# Patient Record
Sex: MALE | Race: WHITE | NOT HISPANIC OR LATINO | URBAN - METROPOLITAN AREA
[De-identification: names, ages, dates, MRNs, and addresses within clinical notes are randomized per-mention and may not be internally consistent; named-entity substitution may affect disease eponyms.]

---

## 2017-01-01 ENCOUNTER — OFFICE VISIT - RIVER FALLS (OUTPATIENT)
Dept: FAMILY MEDICINE | Facility: CLINIC | Age: 32
End: 2017-01-01

## 2017-01-01 ENCOUNTER — COMMUNICATION - RIVER FALLS (OUTPATIENT)
Dept: FAMILY MEDICINE | Facility: CLINIC | Age: 32
End: 2017-01-01

## 2022-02-11 VITALS
DIASTOLIC BLOOD PRESSURE: 86 MMHG | BODY MASS INDEX: 48.92 KG/M2 | SYSTOLIC BLOOD PRESSURE: 146 MMHG | HEART RATE: 87 BPM | WEIGHT: 315 LBS | TEMPERATURE: 99.2 F

## 2022-02-16 NOTE — PROGRESS NOTES
Patient:   KHUSHBU SÁNCHEZ            MRN: [id number]            FIN: [id number]               Age:   32 years     Sex:  Male     :  1985   Associated Diagnoses:   Tonsillitis   Author:   Fredi Castro MD      Visit Information      Date of Service: 2017 06:28 pm  Performing Location: [facility]  Encounter#: [id number]      Primary Care Provider (PCP):  Tono Reynolds MD    NPI# [id number]      Referring Provider:  Fredi Castro MD    NPI# [id number]      Chief Complaint   2017 6:36 PM CDT    Woke up with sore throat, swollen tonsils Monday.  Swelling has gone a bit              Additional Information:No additional information recorded during visit.   Chief complaint and symptoms as noted above and confirmed with patient.  Recent lab and diagnostic studies reviewed with patient      History of Present Illness   2017: Presents with four-day history of tonsillitis and background upper respiratory tract symptoms.  No significant pharyngeal swelling.  No airway obstruction, no respiratory difficulties.  No fever chills.  No over-the-counter medication use.  No antibiotic allergies.       Review of Systems   Constitutional:  Fatigue, No fever, No chills.    Eye:  Negative except as documented in history of present illness.    Ear/Nose/Mouth/Throat:  Nasal congestion, Sore throat.    Respiratory:  No shortness of breath.    Cardiovascular:  No chest pain, No palpitations, No peripheral edema, No syncope.    Gastrointestinal:  No nausea, No vomiting, No abdominal pain.    Genitourinary:  No dysuria, No hematuria.    Hematology/Lymphatics:  Negative except as documented in history of present illness.    Endocrine:  No excessive thirst, No polyuria.    Immunologic:  No recurrent fevers.    Musculoskeletal:  No joint pain, No muscle pain.    Neurologic:  Alert and oriented X4, No numbness, No tingling, No headache.       Health Status   Allergies:    Allergic Reactions (Selected)  No Known  Medication Allergies   Medications:  (Selected)      Problem list:    All Problems  Anxiety / SNOMED CT 88768004 / Confirmed  Mild concentric left ventricular hypertrophy (LVH) / SNOMED CT 29103117 / Confirmed  Morbid obesity / SNOMED CT 262970331 / Probable  Heroin abuse / SNOMED CT 873379389 / Confirmed  Depression, major, recurrent, moderate / SNOMED CT 933844743 / Confirmed  Tobacco user / SNOMED CT 091644458 / Probable      Histories   Past Medical History:    Active  Morbid obesity (759771393)  Depression, major, recurrent, moderate (636391751)  Heroin abuse (168741662)  Mild concentric left ventricular hypertrophy (LVH) (88929962)   Family History:    Diabetes mellitus  Grandfather (M)  Hypertension  Father (Kuldeep)  Heart disease  Father (Kuldeep)  Hypothyroidism  Mother (Robyn)  Heart attack  Grandfather (M)     Procedure history:    None (849236265).   Social History:        Alcohol Assessment            Past                     Comments:                      02/16/2016 - Kathy Renae.      Tobacco Assessment            Snuff, Snuff      Substance Abuse Assessment            Past, Heroin, Marijuana      Employment and Education Assessment            Unemployed, Work/School description: sonam.      Home and Environment Assessment            Marital status: Single.  Lives with Self, Father, Mother.      Exercise and Physical Activity Assessment: Does not exercise        Physical Examination   vital signs stable, as noted above   Vital Signs   6/29/2017 6:36 PM CDT Temperature Tympanic 99.2 DegF    Peripheral Pulse Rate 87 bpm    Systolic Blood Pressure 162 mmHg  HI    Diastolic Blood Pressure 90 mmHg    Mean Arterial Pressure 114 mmHg    BP Site Right arm    BP Systolic Repeat 146 mmHg    BP Diastolic Repeat 86 mmHg      Measurements from flowsheet : Measurements   6/29/2017 6:36 PM CDT    Weight Measured - Standard                358.2 lb     General:  Alert and oriented, No acute  distress.    Eye:  Extraocular movements are intact.    HENT:  Normocephalic, Tympanic membranes are clear, Oral mucosa is moist, bilateral symmetric tonsillar hypertrophy; no exudates.    Neck:  Supple, No carotid bruit, No jugular venous distention, No lymphadenopathy.    Respiratory:  Lungs are clear to auscultation, Respirations are non-labored, Breath sounds are equal.    Cardiovascular:  Normal rate, Regular rhythm, No murmur, No edema.    Gastrointestinal:  Soft.    Musculoskeletal:  Normal range of motion.    Integumentary:  numerous tattoos.    Neurologic:  Alert, Oriented.    Cognition and Speech:  Oriented, Speech clear and coherent.    Psychiatric:  Appropriate mood & affect.       Review / Management   Results review:  Lab results: 6/29/2017 6:45 PM CDT    Group A Strep POC         NOT DETECTED  .       Impression and Plan   Diagnosis     Tonsillitis ([id number] J03.90).       Symptom features look very typical for nonsuppurative tonsillitis.  His rapid strep test is negative.  Counseled him about likely viral etiology   - advised NSAIDs/APAP, cepacol lozenges, salt water rinses, hot tea with honey